# Patient Record
Sex: FEMALE | Race: BLACK OR AFRICAN AMERICAN | URBAN - METROPOLITAN AREA
[De-identification: names, ages, dates, MRNs, and addresses within clinical notes are randomized per-mention and may not be internally consistent; named-entity substitution may affect disease eponyms.]

---

## 2020-07-20 ENCOUNTER — OFFICE VISIT (OUTPATIENT)
Dept: SURGERY | Age: 25
End: 2020-07-20

## 2020-07-20 VITALS
HEIGHT: 63 IN | HEART RATE: 105 BPM | TEMPERATURE: 98.9 F | RESPIRATION RATE: 16 BRPM | WEIGHT: 133 LBS | DIASTOLIC BLOOD PRESSURE: 69 MMHG | SYSTOLIC BLOOD PRESSURE: 107 MMHG | BODY MASS INDEX: 23.57 KG/M2 | OXYGEN SATURATION: 100 %

## 2020-07-20 DIAGNOSIS — Z09 POSTOPERATIVE EXAMINATION: Primary | ICD-10-CM

## 2020-07-20 PROBLEM — L05.91 PILONIDAL CYST: Status: ACTIVE | Noted: 2020-07-20

## 2020-07-20 NOTE — PROGRESS NOTES
Surgery    No complaint. Packing fell out yesterday. As per our discussion, she took no antibiotics. Culture grew only skin boubacar. I and D site at pilonidal cyst doing well, much less inflamed. Not repacked. Dry dressing applied. Patient will change dry dressing daily until there is no more drainage. She will follow up in 2-3 months when site free from inflammation; it can then be removed electively when not infected.     Sara Chapin MD

## 2020-07-20 NOTE — PROGRESS NOTES
1. Have you been to the ER, urgent care clinic since your last visit? Hospitalized since your last visit? No    2. Have you seen or consulted any other health care providers outside of the [address] [place] [address] since your last visit? Include any pap smears or colon screening.  No

## 2020-11-12 PROBLEM — L05.01 PILONIDAL CYST WITH ABSCESS: Status: ACTIVE | Noted: 2020-11-12

## 2022-03-18 PROBLEM — L05.91 PILONIDAL CYST: Status: ACTIVE | Noted: 2020-07-20

## 2022-03-19 PROBLEM — L05.01 PILONIDAL CYST WITH ABSCESS: Status: ACTIVE | Noted: 2020-11-12

## 2023-05-23 RX ORDER — ACYCLOVIR 400 MG/1
TABLET ORAL
COMMUNITY
Start: 2020-07-04